# Patient Record
Sex: MALE | Race: BLACK OR AFRICAN AMERICAN | NOT HISPANIC OR LATINO | URBAN - METROPOLITAN AREA
[De-identification: names, ages, dates, MRNs, and addresses within clinical notes are randomized per-mention and may not be internally consistent; named-entity substitution may affect disease eponyms.]

---

## 2024-08-01 ENCOUNTER — APPOINTMENT (RX ONLY)
Dept: URBAN - METROPOLITAN AREA CLINIC 102 | Facility: CLINIC | Age: 69
Setting detail: DERMATOLOGY
End: 2024-08-01

## 2024-08-01 DIAGNOSIS — L66.89 OTHER CICATRICIAL ALOPECIA: ICD-10-CM | Status: WELL CONTROLLED

## 2024-08-01 DIAGNOSIS — L66.8 OTHER CICATRICIAL ALOPECIA: ICD-10-CM | Status: WELL CONTROLLED

## 2024-08-01 DIAGNOSIS — L73.2 HIDRADENITIS SUPPURATIVA: ICD-10-CM | Status: INADEQUATELY CONTROLLED

## 2024-08-01 PROCEDURE — ? TREATMENT REGIMEN

## 2024-08-01 PROCEDURE — ? ADDITIONAL NOTES

## 2024-08-01 PROCEDURE — ? HUMIRA INITIATION

## 2024-08-01 PROCEDURE — ? COUNSELING

## 2024-08-01 PROCEDURE — 99204 OFFICE O/P NEW MOD 45 MIN: CPT

## 2024-08-01 ASSESSMENT — LOCATION DETAILED DESCRIPTION DERM
LOCATION DETAILED: LEFT AXILLARY VAULT
LOCATION DETAILED: RIGHT AXILLARY VAULT
LOCATION DETAILED: INFERIOR LUMBAR SPINE
LOCATION DETAILED: SUPERIOR MID FOREHEAD

## 2024-08-01 ASSESSMENT — LOCATION ZONE DERM
LOCATION ZONE: AXILLAE
LOCATION ZONE: TRUNK
LOCATION ZONE: FACE

## 2024-08-01 ASSESSMENT — LOCATION SIMPLE DESCRIPTION DERM
LOCATION SIMPLE: RIGHT AXILLARY VAULT
LOCATION SIMPLE: LEFT AXILLARY VAULT
LOCATION SIMPLE: SUPERIOR FOREHEAD
LOCATION SIMPLE: LOWER BACK

## 2024-08-01 NOTE — PROCEDURE: ADDITIONAL NOTES
Additional Notes: He reports history of HS x30 years with intermittent flares. He reports history of multiple therapies and surgeries to remove sinus tracks.\\n[facility]. [facility] and will have his labs faxed over. He was having to travel to [facility] and requested a close location for dermatology care. I explained once his labs are reviewed, I will have to obtain clearance from PCP or oncologist (Hx of CLL) before initiation of Humira. Patient voiced understanding.
Detail Level: Simple
Render Risk Assessment In Note?: no

## 2024-08-01 NOTE — PROCEDURE: HUMIRA INITIATION
Diagnosis (Required): Hidradenitis Suppurativa
Is Methotrexate Contraindicated?: No
Dapsone Specific Contraindications (Override- The Patient.....): patient has history of CLL
Humira Monitoring Guidelines: A yearly test for tuberculosis is required while taking Humira.
Is Dapsone Contraindicated?: Yes
Detail Level: Zone
Humira Dosing: 80 mg SC day 0, 40 mg SC day 7, then 40 mg SC every other week
Pregnancy And Lactation Warning Text: This medication is Pregnancy Category B and is considered safe during pregnancy. It is unknown if this medication is excreted in breast milk.

## 2024-08-01 NOTE — HPI: RASH (HIDRADENITIS SUPPURATIVA)
How Severe Is It?: mild
Is This A New Presentation, Or A Follow-Up?: Follow Up Hidradenitis Suppurativa
Additional History: Diagnosed x30 years ago. He is in the process of getting on Humira and recently [facility]. He is establishing care at our clinic due to being a closer location.

## 2024-08-01 NOTE — PROCEDURE: TREATMENT REGIMEN
Detail Level: Zone
Continue Regimen: Doxycycline 100mg BID PRN flares (prescribe by [facility])
Continue Regimen: Doxycycline 100mg BID PRN flares (prescribed by [facility] provide)\\nTopical Clindamycin PRN flares (prescribed by [facility]) \\n10% Benzoyl peroxide PRN flares (prescribed by [facility])